# Patient Record
Sex: FEMALE | Race: WHITE | URBAN - METROPOLITAN AREA
[De-identification: names, ages, dates, MRNs, and addresses within clinical notes are randomized per-mention and may not be internally consistent; named-entity substitution may affect disease eponyms.]

---

## 2017-06-26 RX ORDER — ANTIARTHRITIC COMBINATION NO.2 900 MG
10 TABLET ORAL DAILY
COMMUNITY

## 2017-06-26 RX ORDER — PANTOTHENIC ACID (VIT B5) 500 MG
1000 TABLET ORAL 2 TIMES DAILY
COMMUNITY

## 2017-06-26 RX ORDER — B-COMPLEX WITH VITAMIN C
1 TABLET ORAL DAILY
COMMUNITY

## 2017-06-27 ENCOUNTER — ANESTHESIA (OUTPATIENT)
Dept: SURGERY | Facility: CLINIC | Age: 62
End: 2017-06-27
Payer: COMMERCIAL

## 2017-06-27 ENCOUNTER — HOSPITAL ENCOUNTER (OUTPATIENT)
Facility: CLINIC | Age: 62
Discharge: HOME | End: 2017-06-27
Attending: PLASTIC SURGERY | Admitting: PLASTIC SURGERY
Payer: COMMERCIAL

## 2017-06-27 ENCOUNTER — ANESTHESIA EVENT (OUTPATIENT)
Dept: SURGERY | Facility: CLINIC | Age: 62
End: 2017-06-27
Payer: COMMERCIAL

## 2017-06-27 VITALS
BODY MASS INDEX: 26.57 KG/M2 | WEIGHT: 175.31 LBS | SYSTOLIC BLOOD PRESSURE: 110 MMHG | OXYGEN SATURATION: 92 % | TEMPERATURE: 97.7 F | HEIGHT: 68 IN | DIASTOLIC BLOOD PRESSURE: 73 MMHG | RESPIRATION RATE: 13 BRPM

## 2017-06-27 DIAGNOSIS — G89.18 ACUTE POST-OPERATIVE PAIN: Primary | ICD-10-CM

## 2017-06-27 PROCEDURE — 25000128 H RX IP 250 OP 636: Performed by: PLASTIC SURGERY

## 2017-06-27 PROCEDURE — 36000056 ZZH SURGERY LEVEL 3 1ST 30 MIN: Performed by: PLASTIC SURGERY

## 2017-06-27 PROCEDURE — 40000170 ZZH STATISTIC PRE-PROCEDURE ASSESSMENT II: Performed by: PLASTIC SURGERY

## 2017-06-27 PROCEDURE — 37000008 ZZH ANESTHESIA TECHNICAL FEE, 1ST 30 MIN: Performed by: PLASTIC SURGERY

## 2017-06-27 PROCEDURE — 71000027 ZZH RECOVERY PHASE 2 EACH 15 MINS: Performed by: PLASTIC SURGERY

## 2017-06-27 PROCEDURE — 25000128 H RX IP 250 OP 636: Performed by: NURSE ANESTHETIST, CERTIFIED REGISTERED

## 2017-06-27 PROCEDURE — 25000125 ZZHC RX 250: Performed by: PLASTIC SURGERY

## 2017-06-27 PROCEDURE — 71000012 ZZH RECOVERY PHASE 1 LEVEL 1 FIRST HR: Performed by: PLASTIC SURGERY

## 2017-06-27 PROCEDURE — 27210794 ZZH OR GENERAL SUPPLY STERILE: Performed by: PLASTIC SURGERY

## 2017-06-27 PROCEDURE — 88305 TISSUE EXAM BY PATHOLOGIST: CPT | Mod: 26 | Performed by: PLASTIC SURGERY

## 2017-06-27 PROCEDURE — 71000013 ZZH RECOVERY PHASE 1 LEVEL 1 EA ADDTL HR: Performed by: PLASTIC SURGERY

## 2017-06-27 PROCEDURE — 37000009 ZZH ANESTHESIA TECHNICAL FEE, EACH ADDTL 15 MIN: Performed by: PLASTIC SURGERY

## 2017-06-27 PROCEDURE — 25000132 ZZH RX MED GY IP 250 OP 250 PS 637: Performed by: ANESTHESIOLOGY

## 2017-06-27 PROCEDURE — 25000125 ZZHC RX 250: Performed by: NURSE ANESTHETIST, CERTIFIED REGISTERED

## 2017-06-27 PROCEDURE — 88305 TISSUE EXAM BY PATHOLOGIST: CPT | Performed by: PLASTIC SURGERY

## 2017-06-27 PROCEDURE — 36000058 ZZH SURGERY LEVEL 3 EA 15 ADDTL MIN: Performed by: PLASTIC SURGERY

## 2017-06-27 RX ORDER — SODIUM CHLORIDE, SODIUM LACTATE, POTASSIUM CHLORIDE, CALCIUM CHLORIDE 600; 310; 30; 20 MG/100ML; MG/100ML; MG/100ML; MG/100ML
INJECTION, SOLUTION INTRAVENOUS CONTINUOUS PRN
Status: DISCONTINUED | OUTPATIENT
Start: 2017-06-27 | End: 2017-06-27

## 2017-06-27 RX ORDER — HYDROCODONE BITARTRATE AND ACETAMINOPHEN 5; 325 MG/1; MG/1
1-2 TABLET ORAL
Status: CANCELLED | OUTPATIENT
Start: 2017-06-27

## 2017-06-27 RX ORDER — CEFAZOLIN SODIUM 1 G/3ML
1 INJECTION, POWDER, FOR SOLUTION INTRAMUSCULAR; INTRAVENOUS SEE ADMIN INSTRUCTIONS
Status: DISCONTINUED | OUTPATIENT
Start: 2017-06-27 | End: 2017-06-27 | Stop reason: HOSPADM

## 2017-06-27 RX ORDER — FENTANYL CITRATE 50 UG/ML
25-50 INJECTION, SOLUTION INTRAMUSCULAR; INTRAVENOUS
Status: DISCONTINUED | OUTPATIENT
Start: 2017-06-27 | End: 2017-06-27 | Stop reason: HOSPADM

## 2017-06-27 RX ORDER — NALOXONE HYDROCHLORIDE 0.4 MG/ML
.1-.4 INJECTION, SOLUTION INTRAMUSCULAR; INTRAVENOUS; SUBCUTANEOUS
Status: DISCONTINUED | OUTPATIENT
Start: 2017-06-27 | End: 2017-06-27 | Stop reason: HOSPADM

## 2017-06-27 RX ORDER — HYDROCODONE BITARTRATE AND ACETAMINOPHEN 5; 325 MG/1; MG/1
1 TABLET ORAL ONCE
Status: COMPLETED | OUTPATIENT
Start: 2017-06-27 | End: 2017-06-27

## 2017-06-27 RX ORDER — FENTANYL CITRATE 50 UG/ML
25-50 INJECTION, SOLUTION INTRAMUSCULAR; INTRAVENOUS EVERY 5 MIN PRN
Status: DISCONTINUED | OUTPATIENT
Start: 2017-06-27 | End: 2017-06-27 | Stop reason: HOSPADM

## 2017-06-27 RX ORDER — ONDANSETRON 2 MG/ML
INJECTION INTRAMUSCULAR; INTRAVENOUS PRN
Status: DISCONTINUED | OUTPATIENT
Start: 2017-06-27 | End: 2017-06-27

## 2017-06-27 RX ORDER — HYDROCODONE BITARTRATE AND ACETAMINOPHEN 5; 325 MG/1; MG/1
1-2 TABLET ORAL EVERY 4 HOURS PRN
Qty: 40 TABLET | Refills: 0 | Status: SHIPPED | OUTPATIENT
Start: 2017-06-27

## 2017-06-27 RX ORDER — SODIUM CHLORIDE, SODIUM LACTATE, POTASSIUM CHLORIDE, CALCIUM CHLORIDE 600; 310; 30; 20 MG/100ML; MG/100ML; MG/100ML; MG/100ML
INJECTION, SOLUTION INTRAVENOUS CONTINUOUS
Status: DISCONTINUED | OUTPATIENT
Start: 2017-06-27 | End: 2017-06-27 | Stop reason: HOSPADM

## 2017-06-27 RX ORDER — PROPOFOL 10 MG/ML
INJECTION, EMULSION INTRAVENOUS PRN
Status: DISCONTINUED | OUTPATIENT
Start: 2017-06-27 | End: 2017-06-27

## 2017-06-27 RX ORDER — PROPOFOL 10 MG/ML
INJECTION, EMULSION INTRAVENOUS CONTINUOUS PRN
Status: DISCONTINUED | OUTPATIENT
Start: 2017-06-27 | End: 2017-06-27

## 2017-06-27 RX ORDER — BUPIVACAINE HYDROCHLORIDE AND EPINEPHRINE 2.5; 5 MG/ML; UG/ML
INJECTION, SOLUTION EPIDURAL; INFILTRATION; INTRACAUDAL; PERINEURAL
Status: DISCONTINUED
Start: 2017-06-27 | End: 2017-06-27 | Stop reason: HOSPADM

## 2017-06-27 RX ORDER — HYDROXYZINE HYDROCHLORIDE 25 MG/1
50 TABLET, FILM COATED ORAL ONCE
Status: COMPLETED | OUTPATIENT
Start: 2017-06-27 | End: 2017-06-27

## 2017-06-27 RX ORDER — CEFAZOLIN SODIUM 2 G/100ML
2 INJECTION, SOLUTION INTRAVENOUS
Status: COMPLETED | OUTPATIENT
Start: 2017-06-27 | End: 2017-06-27

## 2017-06-27 RX ORDER — ACETAMINOPHEN 325 MG/1
975 TABLET ORAL ONCE
Status: COMPLETED | OUTPATIENT
Start: 2017-06-27 | End: 2017-06-27

## 2017-06-27 RX ORDER — BUPIVACAINE HYDROCHLORIDE AND EPINEPHRINE 2.5; 5 MG/ML; UG/ML
INJECTION, SOLUTION INFILTRATION; PERINEURAL PRN
Status: DISCONTINUED | OUTPATIENT
Start: 2017-06-27 | End: 2017-06-27 | Stop reason: HOSPADM

## 2017-06-27 RX ORDER — FENTANYL CITRATE 50 UG/ML
INJECTION, SOLUTION INTRAMUSCULAR; INTRAVENOUS PRN
Status: DISCONTINUED | OUTPATIENT
Start: 2017-06-27 | End: 2017-06-27

## 2017-06-27 RX ORDER — ONDANSETRON 4 MG/1
4 TABLET, ORALLY DISINTEGRATING ORAL EVERY 30 MIN PRN
Status: DISCONTINUED | OUTPATIENT
Start: 2017-06-27 | End: 2017-06-27 | Stop reason: HOSPADM

## 2017-06-27 RX ORDER — MEPERIDINE HYDROCHLORIDE 25 MG/ML
12.5 INJECTION INTRAMUSCULAR; INTRAVENOUS; SUBCUTANEOUS
Status: DISCONTINUED | OUTPATIENT
Start: 2017-06-27 | End: 2017-06-27 | Stop reason: HOSPADM

## 2017-06-27 RX ORDER — CELECOXIB 200 MG/1
200 CAPSULE ORAL
Status: CANCELLED | OUTPATIENT
Start: 2017-06-27

## 2017-06-27 RX ORDER — DEXAMETHASONE SODIUM PHOSPHATE 4 MG/ML
INJECTION, SOLUTION INTRA-ARTICULAR; INTRALESIONAL; INTRAMUSCULAR; INTRAVENOUS; SOFT TISSUE PRN
Status: DISCONTINUED | OUTPATIENT
Start: 2017-06-27 | End: 2017-06-27

## 2017-06-27 RX ORDER — ONDANSETRON 2 MG/ML
4 INJECTION INTRAMUSCULAR; INTRAVENOUS EVERY 30 MIN PRN
Status: DISCONTINUED | OUTPATIENT
Start: 2017-06-27 | End: 2017-06-27 | Stop reason: HOSPADM

## 2017-06-27 RX ORDER — EPHEDRINE SULFATE 50 MG/ML
INJECTION, SOLUTION INTRAMUSCULAR; INTRAVENOUS; SUBCUTANEOUS PRN
Status: DISCONTINUED | OUTPATIENT
Start: 2017-06-27 | End: 2017-06-27

## 2017-06-27 RX ORDER — LIDOCAINE HYDROCHLORIDE 20 MG/ML
INJECTION, SOLUTION INFILTRATION; PERINEURAL PRN
Status: DISCONTINUED | OUTPATIENT
Start: 2017-06-27 | End: 2017-06-27

## 2017-06-27 RX ADMIN — DEXMEDETOMIDINE HYDROCHLORIDE 8 MCG: 100 INJECTION, SOLUTION INTRAVENOUS at 14:26

## 2017-06-27 RX ADMIN — SODIUM CHLORIDE, POTASSIUM CHLORIDE, SODIUM LACTATE AND CALCIUM CHLORIDE: 600; 310; 30; 20 INJECTION, SOLUTION INTRAVENOUS at 13:23

## 2017-06-27 RX ADMIN — FENTANYL CITRATE 50 MCG: 50 INJECTION, SOLUTION INTRAMUSCULAR; INTRAVENOUS at 14:02

## 2017-06-27 RX ADMIN — PROPOFOL 180 MCG/KG/MIN: 10 INJECTION, EMULSION INTRAVENOUS at 13:24

## 2017-06-27 RX ADMIN — MIDAZOLAM HYDROCHLORIDE 2 MG: 1 INJECTION, SOLUTION INTRAMUSCULAR; INTRAVENOUS at 13:23

## 2017-06-27 RX ADMIN — SODIUM CHLORIDE, POTASSIUM CHLORIDE, SODIUM LACTATE AND CALCIUM CHLORIDE: 600; 310; 30; 20 INJECTION, SOLUTION INTRAVENOUS at 15:03

## 2017-06-27 RX ADMIN — Medication 5 MG: at 13:33

## 2017-06-27 RX ADMIN — DEXMEDETOMIDINE HYDROCHLORIDE 8 MCG: 100 INJECTION, SOLUTION INTRAVENOUS at 14:43

## 2017-06-27 RX ADMIN — Medication 5 MG: at 15:11

## 2017-06-27 RX ADMIN — HYDROXYZINE HYDROCHLORIDE 50 MG: 25 TABLET ORAL at 11:56

## 2017-06-27 RX ADMIN — Medication 5 MG: at 13:31

## 2017-06-27 RX ADMIN — Medication 5 MG: at 15:02

## 2017-06-27 RX ADMIN — FENTANYL CITRATE 50 MCG: 50 INJECTION, SOLUTION INTRAMUSCULAR; INTRAVENOUS at 13:23

## 2017-06-27 RX ADMIN — ONDANSETRON 4 MG: 2 INJECTION INTRAMUSCULAR; INTRAVENOUS at 14:24

## 2017-06-27 RX ADMIN — FENTANYL CITRATE 50 MCG: 50 INJECTION, SOLUTION INTRAMUSCULAR; INTRAVENOUS at 14:18

## 2017-06-27 RX ADMIN — ONDANSETRON 4 MG: 2 INJECTION INTRAMUSCULAR; INTRAVENOUS at 14:58

## 2017-06-27 RX ADMIN — DEXMEDETOMIDINE HYDROCHLORIDE 4 MCG: 100 INJECTION, SOLUTION INTRAVENOUS at 14:57

## 2017-06-27 RX ADMIN — DEXAMETHASONE SODIUM PHOSPHATE 4 MG: 4 INJECTION, SOLUTION INTRA-ARTICULAR; INTRALESIONAL; INTRAMUSCULAR; INTRAVENOUS; SOFT TISSUE at 13:28

## 2017-06-27 RX ADMIN — PHENYLEPHRINE HYDROCHLORIDE 100 MCG: 10 INJECTION, SOLUTION INTRAMUSCULAR; INTRAVENOUS; SUBCUTANEOUS at 13:59

## 2017-06-27 RX ADMIN — PROPOFOL 200 MG: 10 INJECTION, EMULSION INTRAVENOUS at 13:24

## 2017-06-27 RX ADMIN — FENTANYL CITRATE 50 MCG: 50 INJECTION, SOLUTION INTRAMUSCULAR; INTRAVENOUS at 13:54

## 2017-06-27 RX ADMIN — CEFAZOLIN SODIUM 2 G: 2 INJECTION, SOLUTION INTRAVENOUS at 13:28

## 2017-06-27 RX ADMIN — PHENYLEPHRINE HYDROCHLORIDE 100 MCG: 10 INJECTION, SOLUTION INTRAMUSCULAR; INTRAVENOUS; SUBCUTANEOUS at 13:31

## 2017-06-27 RX ADMIN — PHENYLEPHRINE HYDROCHLORIDE 100 MCG: 10 INJECTION, SOLUTION INTRAMUSCULAR; INTRAVENOUS; SUBCUTANEOUS at 13:33

## 2017-06-27 RX ADMIN — HYDROCODONE BITARTRATE AND ACETAMINOPHEN 1 TABLET: 5; 325 TABLET ORAL at 16:39

## 2017-06-27 RX ADMIN — DEXMEDETOMIDINE HYDROCHLORIDE 4 MCG: 100 INJECTION, SOLUTION INTRAVENOUS at 14:52

## 2017-06-27 RX ADMIN — ACETAMINOPHEN 975 MG: 325 TABLET, FILM COATED ORAL at 11:56

## 2017-06-27 RX ADMIN — LIDOCAINE HYDROCHLORIDE 60 MG: 20 INJECTION, SOLUTION INFILTRATION; PERINEURAL at 13:24

## 2017-06-27 RX ADMIN — FENTANYL CITRATE 50 MCG: 50 INJECTION, SOLUTION INTRAMUSCULAR; INTRAVENOUS at 14:09

## 2017-06-27 NOTE — BRIEF OP NOTE
[facility] Brief Operative Note    Pre-operative diagnosis: MACROMASTIA   Post-operative diagnosis same   Procedure: Procedure(s):  BILATERAL REDUCTION MAMMOPLASTY - Wound Class: I-Clean   Surgeon(s): Surgeon(s) and Role:     * Dada Batista MD - Primary   Estimated blood loss: * No values recorded between 6/27/2017  1:34 PM and 6/27/2017  3:21 PM *    Specimens:   ID Type Source Tests Collected by Time Destination   A : right breast tissue wt: 615 grams Tissue Breast, Right SURGICAL PATHOLOGY EXAM Dada Batista MD 6/27/2017  2:17 PM    B : left breast tissue wt: 650 grams Tissue Breast, Left SURGICAL PATHOLOGY EXAM Dada Batista MD 6/27/2017  2:36 PM       Findings: Normal breast tissue

## 2017-06-27 NOTE — IP AVS SNAPSHOT
[facility]    [address]    [place] [address]    Phone:  [phone]    Fax:  [phone]                                       After Visit Summary   6/27/2017    Bernadette K Yeoman Ouellette    MRN: [id number]           After Visit Summary Signature Page     I have received my discharge instructions, and my questions have been answered. I have discussed any challenges I see with this plan with the nurse or doctor.    ..........................................................................................................................................  Patient/Patient Representative Signature      ..........................................................................................................................................  Patient Representative Print Name and Relationship to Patient    ..................................................               ................................................  Date                                            Time    ..........................................................................................................................................  Reviewed by Signature/Title    ...................................................              ..............................................  Date                                                            Time

## 2017-06-27 NOTE — DISCHARGE INSTRUCTIONS
While you were at the hospital today you were given 975 mg of Tylenol at 12:00 noon. The recommended daily maximum dose is 4000 mg.         Same Day Surgery Discharge Instructions for  Sedation and General Anesthesia       It's not unusual to feel dizzy, light-headed or faint for up to 24 hours after surgery or while taking pain medication.  If you have these symptoms: sit for a few minutes before standing and have someone assist you when you get up to walk or use the bathroom.      You should rest and relax for the next 24 hours. We recommend you make arrangements to have an adult stay with you for at least 24 hours after your discharge.  Avoid hazardous and strenuous activity.      DO NOT DRIVE any vehicle or operate mechanical equipment for 24 hours following the end of your surgery.  Even though you may feel normal, your reactions may be affected by the medication you have received.      Do not drink alcoholic beverages for 24 hours following surgery.       Slowly progress to your regular diet as you feel able. It's not unusual to feel nauseated and/or vomit after receiving anesthesia.  If you develop these symptoms, drink clear liquids (apple juice, ginger ale, broth, 7-up, etc. ) until you feel better.  If your nausea and vomiting persists for 24 hours, please notify your surgeon.        All narcotic pain medications, along with inactivity and anesthesia, can cause constipation. Drinking plenty of liquids and increasing fiber intake will help.      For any questions of a medical nature, call your surgeon.      Do not make important decisions for 24 hours.      If you had general anesthesia, you may have a sore throat for a couple of days related to the breathing tube used during surgery.  You may use Cepacol lozenges to help with this discomfort.  If it worsens or if you develop a fever, contact your surgeon.       If you feel your pain is not well managed with the pain medications prescribed by your surgeon,  please contact your surgeon's office to let them know so they can address your concerns.            Discharge Instructions following Breast Surgery  [facility]    Diet:   Resume diet as tolerated.  Drink plenty of fluids to prevent constipation.    Activity:   Gentle rotation & stretching of your arms/shoulders will prevent stiffness in joints   Increase activity gradually   No heavy lifting greater than 10-15 pounds & no strenuous activity until  approved by surgeon    Bathing/Incision Care:   You may shower as directed by surgeon   Pat incisions dry.  No lotions, powders or perfumes to incisions   Tape dressings (steri strips) will fall off in 7-10 days (if present)    What to expect:   A tingly or itchy sensation around the incision is a normal sign of healing   Some clear, pink drainage from incisions is normal.      Notify your surgeon for the following signs & symptoms:   Redness, warmth, or swelling of the incision    Foul smelling or increased drainage   Chills or temperature greater than 101 F   Pain not controlled by pain medications              Eleazar Gallegos Drain  Home Care Instructions    What is a Eleazar Gallegos (TJ) Drain?  This is a small tube that connects to a bulb.  Its gentle suction removes extra fluid from a surgical wound.  Your doctor will remove the tube when the amount of fluid decreases.  The color and amount of fluid varies.  Right after surgery the fluid is bright red.  Over time, it changes to light pink and may become clear or the color of straw.    How should I care for my tube site?    Keep the skin around the tube dry.  Check with your doctor about how to shower.  You may need to cover the site with plastic when you shower.  Or, it may be okay to let the site get wet and put on a clean bandage after you shower.      If the bandage gets wet, you will need to change it.  How should I care for the bulb?    Keep the bulb compressed at all times except while you  empty it.     Attach the bulb to your clothing with tape and a safety pin.    Try to empty the bulb at the same time every day.  Empty the bulb at least once a day, or when the bulb becomes half full.  If it becomes too full, there will not be enough suction.    To empty the bulb:    Wash your hands.    Open the bulb cap.    Drain the fluid from the bulb into the measuring cup.  If you have two drains, use two cups.      Clean the mouth of the bulb with an alcohol wipe if your nurse told you to.    Squeeze the bulb (fold it in half before you close the bulb cap) If it does not stay compressed, call your nurse or clinic.    Write the amount of drainage on the drainage record (see back page).  If you have two drains, write the amount for each bulb.    Flush the drainage down the toilet.  Rinse the measuring cup.    Wash your hands.    When should I call my doctor?   Call your doctor if:    You have a fever over 101 F (38.3 C), taken under the tongue.     The drainage increases or smells bad.    The skin around your tube has increased redness, swelling, warmth or pain.    You have pus or fluid leaking at the tube site.    Your stitches break.    You think the tube is not draining.    The tube falls out.    You have any problems or concerns.    Your drainage record:    Empty your bulb at least once per day or when 1/2 to 1/3 full.  Write down the date, time and amount of drainage in each bulb.   Bring this record to each clinic visit.    Date Time Bulb 1: amount of  Drainage in (ml or cc) Bulb 2: amount of drainage (in ml or cc) Notes                                                                **If you have questions or concerns about your procedure,  call Dr. Batista at [phone]**

## 2017-06-27 NOTE — ANESTHESIA CARE TRANSFER NOTE
Patient: Bernadette K Yeoman Ouellette    Procedure(s):  BILATERAL REDUCTION MAMMOPLASTY - Wound Class: I-Clean    Diagnosis: MACROMASTIA  Diagnosis Additional Information: No value filed.    Anesthesia Type:   General, LMA     Note:  Airway :Face Mask  Patient transferred to:PACU  Comments: Pt exhibits spontaneous respirations, follows commands, suctioned, LMA removed, exchanging well, transferred to pacu with O2 @ 10L via mask, all monitors and alarms on, report to RN, VSS.      Vitals: (Last set prior to Anesthesia Care Transfer)    CRNA VITALS  6/27/2017 1452 - 6/27/2017 1528      6/27/2017             NIBP: 116/76    NIBP Mean: 106                Electronically Signed By: SAKSHI Phan CRNA  June 27, 2017  3:28 PM

## 2017-06-27 NOTE — OR NURSING
PNDS met, po per I&O sheet. Pt dressed, up in recliner and transported to Phase 2.   Eleazar-Rosy drain care instructions demonstrated with both Mrs. Yeoman and her .   Sent home with  drain care supplies.

## 2017-06-27 NOTE — ANESTHESIA PREPROCEDURE EVALUATION
Anesthesia Evaluation     . Pt has had prior anesthetic. Type: General    No history of anesthetic complications          ROS/MED HX    ENT/Pulmonary:  - neg pulmonary ROS     Neurologic:     (+)migraines,     Cardiovascular:  - neg cardiovascular ROS       METS/Exercise Tolerance:     Hematologic:         Musculoskeletal:         GI/Hepatic:  - neg GI/hepatic ROS       Renal/Genitourinary:         Endo:         Psychiatric:         Infectious Disease:         Malignancy:         Other:                     Physical Exam  Normal systems: cardiovascular, pulmonary and dental    Airway   Mallampati: II  TM distance: >3 FB  Neck ROM: full    Dental     Cardiovascular   Rhythm and rate: regular and normal      Pulmonary    breath sounds clear to auscultation                    Anesthesia Plan      History & Physical Review  History and physical reviewed and following examination; no interval change.    ASA Status:  2 .        Plan for General and LMA with Propofol induction. Maintenance will be TIVA.    PONV prophylaxis:  Ondansetron (or other 5HT-3) and Dexamethasone or Solumedrol       Postoperative Care  Postoperative pain management:  IV analgesics and Oral pain medications.      Consents  Anesthetic plan, risks, benefits and alternatives discussed with:  Patient..                          .

## 2017-06-27 NOTE — ANESTHESIA POSTPROCEDURE EVALUATION
Patient: Bernadette K Yeoman Ouellette    Procedure(s):  BILATERAL REDUCTION MAMMOPLASTY - Wound Class: I-Clean    Diagnosis:MACROMASTIA  Diagnosis Additional Information: No value filed.    Anesthesia Type:  General, LMA    Note:  Anesthesia Post Evaluation    Patient location during evaluation: PACU  Patient participation: Able to fully participate in evaluation  Level of consciousness: awake  Pain management: adequate  Airway patency: patent  Cardiovascular status: acceptable  Respiratory status: acceptable  Hydration status: acceptable  PONV: none     Anesthetic complications: None          Last vitals:  Vitals:    06/27/17 1145 06/27/17 1525 06/27/17 1530   BP: 143/77 104/64 108/65   Resp: 16 10 9   Temp: 36.7  C (98.1  F) 36.3  C (97.3  F) 35.7  C (96.3  F)   SpO2: 97% 98% 99%         Electronically Signed By: Leobardo Bojorquez MD  June 27, 2017  3:48 PM

## 2017-06-27 NOTE — IP AVS SNAPSHOT
MRN:[id number]                      After Visit Summary   6/27/2017    Bernadette K Yeoman Ouellette    MRN: [id number]           Thank you!     Thank you for choosing [facility] for your care. Our goal is always to provide you with excellent care. Hearing back from our patients is one way we can continue to improve our services. Please take a few minutes to complete the written survey that you may receive in the mail after you visit with us. Thank you!        Patient Information     Date Of Birth          1955        About your hospital stay     You were admitted on:  June 27, 2017 You last received care in the:  [facility]    You were discharged on:  June 27, 2017       Who to Call     For medical emergencies, please call 911.  For non-urgent questions about your medical care, please call your primary care provider or clinic, [phone]  For questions related to your surgery, please call your surgery clinic        Attending Provider     Provider Specialty    Dada Batista MD Plastic Surgery       Primary Care Provider Office Phone # Fax #    Solange Lamb [phone]      After Care Instructions     Discharge Instructions       Resume pre procedure diet            Discharge Instructions       Lifting limit of  ten  pounds until seen at Post-op follow up appointment            Discharge Instructions       Leave ace wrap on for 48 hours, adjust if too tight or too loose.  Remove after 48 hours and shower, leave steri strips intact, use sports bra or re wrap ace to breasts. Keep elbows at sides, no lifting or reaching.            Discharge Instructions       Follow up with Surgeon in  Two days.  Call clinic with any problems.            Ice to affected area       Ice PRN, No heat to area for 24 hours for medical brachial blocks            No Alcohol       No Alcohol for 24 hours post procedure            No Aspirin, Ibuprofen or Naproxen       No  Aspirin, Ibuprofen or Naproxen products for 7 - 10 days following surgery            No driving or operating machinery       No driving or operating machinery until day after procedure                  Further instructions from your care team       While you were at the hospital today you were given 975 mg of Tylenol at 12:00 noon. The recommended daily maximum dose is 4000 mg.         Same Day Surgery Discharge Instructions for  Sedation and General Anesthesia       It's not unusual to feel dizzy, light-headed or faint for up to 24 hours after surgery or while taking pain medication.  If you have these symptoms: sit for a few minutes before standing and have someone assist you when you get up to walk or use the bathroom.      You should rest and relax for the next 24 hours. We recommend you make arrangements to have an adult stay with you for at least 24 hours after your discharge.  Avoid hazardous and strenuous activity.      DO NOT DRIVE any vehicle or operate mechanical equipment for 24 hours following the end of your surgery.  Even though you may feel normal, your reactions may be affected by the medication you have received.      Do not drink alcoholic beverages for 24 hours following surgery.       Slowly progress to your regular diet as you feel able. It's not unusual to feel nauseated and/or vomit after receiving anesthesia.  If you develop these symptoms, drink clear liquids (apple juice, ginger ale, broth, 7-up, etc. ) until you feel better.  If your nausea and vomiting persists for 24 hours, please notify your surgeon.        All narcotic pain medications, along with inactivity and anesthesia, can cause constipation. Drinking plenty of liquids and increasing fiber intake will help.      For any questions of a medical nature, call your surgeon.      Do not make important decisions for 24 hours.      If you had general anesthesia, you may have a sore throat for a couple of days related to the breathing  tube used during surgery.  You may use Cepacol lozenges to help with this discomfort.  If it worsens or if you develop a fever, contact your surgeon.       If you feel your pain is not well managed with the pain medications prescribed by your surgeon, please contact your surgeon's office to let them know so they can address your concerns.            Discharge Instructions following Breast Surgery  [facility]    Diet:   Resume diet as tolerated.  Drink plenty of fluids to prevent constipation.    Activity:   Gentle rotation & stretching of your arms/shoulders will prevent stiffness in joints   Increase activity gradually   No heavy lifting greater than 10-15 pounds & no strenuous activity until  approved by surgeon    Bathing/Incision Care:   You may shower as directed by surgeon   Pat incisions dry.  No lotions, powders or perfumes to incisions   Tape dressings (steri strips) will fall off in 7-10 days (if present)    What to expect:   A tingly or itchy sensation around the incision is a normal sign of healing   Some clear, pink drainage from incisions is normal.      Notify your surgeon for the following signs & symptoms:   Redness, warmth, or swelling of the incision    Foul smelling or increased drainage   Chills or temperature greater than 101 F   Pain not controlled by pain medications              Eleazar Gallegos Drain  Home Care Instructions    What is a Eleazar Gallegos (TJ) Drain?  This is a small tube that connects to a bulb.  Its gentle suction removes extra fluid from a surgical wound.  Your doctor will remove the tube when the amount of fluid decreases.  The color and amount of fluid varies.  Right after surgery the fluid is bright red.  Over time, it changes to light pink and may become clear or the color of straw.    How should I care for my tube site?    Keep the skin around the tube dry.  Check with your doctor about how to shower.  You may need to cover the site with plastic  when you shower.  Or, it may be okay to let the site get wet and put on a clean bandage after you shower.      If the bandage gets wet, you will need to change it.  How should I care for the bulb?    Keep the bulb compressed at all times except while you empty it.     Attach the bulb to your clothing with tape and a safety pin.    Try to empty the bulb at the same time every day.  Empty the bulb at least once a day, or when the bulb becomes half full.  If it becomes too full, there will not be enough suction.    To empty the bulb:    Wash your hands.    Open the bulb cap.    Drain the fluid from the bulb into the measuring cup.  If you have two drains, use two cups.      Clean the mouth of the bulb with an alcohol wipe if your nurse told you to.    Squeeze the bulb (fold it in half before you close the bulb cap) If it does not stay compressed, call your nurse or clinic.    Write the amount of drainage on the drainage record (see back page).  If you have two drains, write the amount for each bulb.    Flush the drainage down the toilet.  Rinse the measuring cup.    Wash your hands.    When should I call my doctor?   Call your doctor if:    You have a fever over 101 F (38.3 C), taken under the tongue.     The drainage increases or smells bad.    The skin around your tube has increased redness, swelling, warmth or pain.    You have pus or fluid leaking at the tube site.    Your stitches break.    You think the tube is not draining.    The tube falls out.    You have any problems or concerns.    Your drainage record:    Empty your bulb at least once per day or when 1/2 to 1/3 full.  Write down the date, time and amount of drainage in each bulb.   Bring this record to each clinic visit.    Date Time Bulb 1: amount of  Drainage in (ml or cc) Bulb 2: amount of drainage (in ml or cc) Notes                                                                **If you have questions or concerns about your procedure,  call   "[facility] at [phone]**          Pending Results     Date and Time Order Name Status Description    2017 1418 Surgical pathology exam In process             Admission Information     Date & Time Provider Department Dept. Phone    2017 Dada Batista MD [facility] Same Day Surgery [phone]      Your Vitals Were     Blood Pressure Temperature Respirations Height Weight Pulse Oximetry    108/66 97.7  F (36.5  C) (Skin) 13 1.727 m (5' 8\") 79.5 kg (175 lb 5 oz) 94%    BMI (Body Mass Index)                   26.66 kg/m2           MyChart Information     [employer] lets you send messages to your doctor, view your test results, renew your prescriptions, schedule appointments and more. To sign up, go to [email or web address] . Click on \"Log in\" on the left side of the screen, which will take you to the Welcome page. Then click on \"Sign up Now\" on the right side of the page.     You will be asked to enter the access code listed below, as well as some personal information. Please follow the directions to create your username and password.     Your access code is: [email or web address]  Expires: 2017  3:48 PM     Your access code will  in 90 days. If you need help or a new code, please call your [facility] clinic or [phone].        Care EveryWhere ID     This is your Care EveryWhere ID. This could be used by other organizations to access your [facility] medical records  [id number]        Equal Access to Services     MONIK SALEEM : [place] Sohyacinth, waaxda luqadaha, qaybta kaalmada devi, jing stafford. So [facility] [phone].    ATENCIÓN: Si habla español, tiene a miner disposición servicios gratuitos de asistencia lingüística. Llame al [phone].    We comply with applicable federal civil rights laws and [place] laws. We do not discriminate on the basis of race, color, national origin, age, disability sex, sexual orientation or gender identity.       "         Review of your medicines      START taking        Dose / Directions    HYDROcodone-acetaminophen 5-325 MG per tablet   Commonly known as:  NORCO   Used for:  Acute post-operative pain   Notes to Patient:  You had 1 [place] at 4:45 pm today.        Dose:  1-2 tablet   Take 1-2 tablets by mouth every 4 hours as needed for other (Moderate to Severe Pain)   Quantity:  40 tablet   Refills:  0         CONTINUE these medicines which have NOT CHANGED        Dose / Directions    ACETYLCYSTEINE PO        Dose:  600 mg   Take 600 mg by mouth 2 times daily   Refills:  0       CITRUCEL PO        Dose:  500 mg   Take 500 mg by mouth 8 CAPSULES PER DAY   Refills:  0       COLACE PO        Dose:  250 mg   Take 250 mg by mouth daily   Refills:  0       DETROL LA PO        Dose:  4 mg   Take 4 mg by mouth daily   Refills:  0       dhea 25 MG Tabs        Dose:  10 mg   Take 10 mg by mouth daily   Refills:  0       glutamine 500 MG capsule        Dose:  500 mg   Take 500 mg by mouth 3 times daily   Refills:  0       MAGNESIUM OXIDE PO        Dose:  150 mg   Take 150 mg by mouth 2 times daily   Refills:  0       pantothenic acid 500 MG Tabs        Dose:  1000 mg   Take 1,000 mg by mouth 2 times daily   Refills:  0       PROBIOTIC ADVANCED PO        Dose:  1 tablet   Take 1 tablet by mouth daily   Refills:  0       * UNABLE TO FIND        Apply topically daily MEDICATION NAME: estradiol-estriol-progesterone   Refills:  0       * UNABLE TO FIND        Dose:  80 mg   80 mg 3 times daily as needed MEDICATION NAME: Cytozyme   Refills:  0       Zinc 100 MG Tabs        Dose:  1 tablet   Take 1 tablet by mouth daily   Refills:  0       * Notice:  This list has 2 medication(s) that are the same as other medications prescribed for you. Read the directions carefully, and ask your doctor or other care provider to review them with you.         Where to get your medicines      Some of these will need a paper prescription and others can be  bought over the counter. Ask your nurse if you have questions.     Bring a paper prescription for each of these medications     HYDROcodone-acetaminophen 5-325 MG per tablet                Protect others around you: Learn how to safely use, store and throw away your medicines at www.disposemymeds.org.             Medication List: This is a list of all your medications and when to take them. Check marks below indicate your daily home schedule. Keep this list as a reference.      Medications           Morning Afternoon Evening Bedtime As Needed    ACETYLCYSTEINE PO   Take 600 mg by mouth 2 times daily                                CITRUCEL PO   Take 500 mg by mouth 8 CAPSULES PER DAY                                COLACE PO   Take 250 mg by mouth daily                                DETROL LA PO   Take 4 mg by mouth daily                                dhea 25 MG Tabs   Take 10 mg by mouth daily                                glutamine 500 MG capsule   Take 500 mg by mouth 3 times daily                                HYDROcodone-acetaminophen 5-325 MG per tablet   Commonly known as:  NORCO   Take 1-2 tablets by mouth every 4 hours as needed for other (Moderate to Severe Pain)   Last time this was given:  1 tablet on 6/27/2017  4:39 PM   Notes to Patient:  You had 1 [place] at 4:45 pm today.                                MAGNESIUM OXIDE PO   Take 150 mg by mouth 2 times daily                                pantothenic acid 500 MG Tabs   Take 1,000 mg by mouth 2 times daily                                PROBIOTIC ADVANCED PO   Take 1 tablet by mouth daily                                * UNABLE TO FIND   Apply topically daily MEDICATION NAME: estradiol-estriol-progesterone                                * UNABLE TO FIND   80 mg 3 times daily as needed MEDICATION NAME: Cytozyme                                Zinc 100 MG Tabs   Take 1 tablet by mouth daily                                * Notice:  This list has 2  medication(s) that are the same as other medications prescribed for you. Read the directions carefully, and ask your doctor or other care provider to review them with you.

## 2017-06-28 LAB — COPATH REPORT: NORMAL

## 2017-08-03 NOTE — OP NOTE
Provider:  Dada Batista MD ([id number])   Patient:  YEOMAN OUELLETTE, BERNADETTE  MRN:  [id number]  :  1955    DATE OF SERVICE:  2017    PREOPERATIVE DIAGNOSIS:  Bilateral symptomatic macromastia.      POSTOPERATIVE DIAGNOSIS:  Bilateral symptomatic macromastia.      PROCEDURE:  Bilateral breast reduction.      SURGEON:  Dada Batista MD    ANESTHESIA:  General.    COMPLICATIONS:  None.      INDICATIONS:  The patient is a 62-year-old female with dense heavy breasts bilaterally contributing directly to upper back, neck and shoulder pain.  She desires a bilateral reduction mammoplasty for alleviation of symptoms.  I have discussed with her preoperatively the risks which include but are not exclusive to bleeding, infection, wound healing complications, nipple sensitivity changes, abnormal pathology, asymmetry, need for revisional surgery and scarring.  She would like to proceed.      DESCRIPTION OF PROCEDURE:  The patient was marked and taken to the operating room and placed supine.  After smooth induction of general anesthesia, she was prepped and draped sterilely.  I began on the right-hand side.  All incisions were scored after injection with 0.25% Marcaine with epinephrine.  After scoring incisions, the medial-based pedicle was developed by de-epithelialization and dissection of the chest wall, leaving a broad base.  Appropriate tissue inferolaterally and superiorly was resected.  Hemostasis was achieved with electrocautery.  The breast was coned into position with 2-0 Vicryl and the nipple-areolar complex was inset with 3-0 Monocryl.  A small dog ear was corrected laterally and liposuction was performed in the axillary region to flatten it out.  Further Marcaine was injected subfascially.  Total resected weight was 615 g.  A mirror procedure was done contralaterally, resecting 650 g.  At this point, she had excellent shape and symmetry.  Final closure was performed with a running 3-0 and 4-0  Monocryl suture.  The only issue was, in closing the inferolateral incision on the right-hand side, a needle broke off from the swage portion of the suture and was embedded in the tissue.  It was later identified, found and retrieved without difficulty.  Final closure was performed.  A compressive dressing was placed.  Estimated blood loss was 50 mL.  She was extubated in the operating room and taken to recovery in good condition.

## 2017-08-09 NOTE — OP NOTE
DATE OF PROCEDURE:  06/27/2017      PREOPERATIVE DIAGNOSIS:  Bilateral symptomatic macromastia.      POSTOPERATIVE DIAGNOSIS:  Bilateral symptomatic macromastia.      PROCEDURE:  Bilateral breast reduction.      SURGEON:  Marce Mantilla MD      ANESTHESIA:  General.      COMPLICATIONS:  None.      INDICATIONS:  Bernadette Yeoman Ouellette is a 62-year-old female with chronic back, neck and shoulder discomfort, desiring bilateral reduction mammoplasty.  I have discussed with her preoperatively the proposed procedure.  We talked about the risks which include bleeding, infection, nipple sensitivity changes wound healing complication, scarring, abnormal pathology, asymmetry, dissatisfaction with size, shape, etc.  She wishes to proceed.      DESCRIPTION OF PROCEDURE:  The patient was marked with standard Wise patterns bilaterally.  She was taken to the operating room and placed supine.  After smooth induction of general anesthesia, she was prepped and draped sterilely.  I began on the right side, all incisions were injected with 0.25% Marcaine with epinephrine and then scored.  The medial based pedicle was de-epithelialized and was created by dissection of the chest wall, leaving a broad base.  Appropriate tissue inferolaterally and superiorly was resected.  Hemostasis was achieved with electrocautery.  The breast was coned into position with 3-0 Monocryl in the nipple areolar complex and 2-0 Vicryls along the horizontal and vertical limbs.  The weight of this right breast was 615 grams that was resected.  A mirror procedure was done contralaterally resecting 650 grams.  She had excellent shape and symmetry.  Final closure was performed with 3-0 and 4-0 Monocryl sutures.  Small amount of liposuction was performed laterally for shaping purposes.  She was awakened from anesthesia and taken to the recovery room in good condition.         MARCE MANTILLA MD             D: 08/08/2017 15:50   T: 08/08/2017 22:18    MT: [id number]      Name:     YEOMAN OUELLETTE, BERNADETTE   MRN:      [id number]        Account:        [id number]   :      1955           Procedure Date: 2017      Document: [id number]

## (undated) DEVICE — NDL SPINAL 22GA 3.5" QUINCKE 405181

## (undated) DEVICE — BLADE KNIFE SURG 10 371110

## (undated) DEVICE — DRSG ADAPTIC 3X8" 6113

## (undated) DEVICE — GLOVE PROTEXIS W/NEU-THERA 7.0  2D73TE70

## (undated) DEVICE — PEN MARKING SKIN

## (undated) DEVICE — DRSG ABDOMINAL 12X16"

## (undated) DEVICE — SU SILK 2-0 FSL 18" 677G

## (undated) DEVICE — DRAIN JACKSON PRATT CHANNEL 19FR ROUND HUBLESS SIL JP-2230

## (undated) DEVICE — LINEN TOWEL PACK X5 5464

## (undated) DEVICE — PAD CHUX UNDERPAD 23X24" 7136

## (undated) DEVICE — SYR 03ML LL W/O NDL 309657

## (undated) DEVICE — GLOVE PROTEXIS W/NEU-THERA 6.5  2D73TE65

## (undated) DEVICE — SOL NACL 0.9% IRRIG 1000ML BOTTLE 07138-09

## (undated) DEVICE — TUBING SUCTION LIPECTOMY

## (undated) DEVICE — MAMMARY SUPPORT MED LATEX

## (undated) DEVICE — SU MONOCRYL 3-0 PS-2 27" Y427H

## (undated) DEVICE — PREP SKIN SCRUB TRAY 4461A

## (undated) DEVICE — SUCTION CANISTER MEDIVAC LINER 3000ML W/LID 65651-530

## (undated) DEVICE — SYR 10ML FINGER CONTROL W/O NDL 309695

## (undated) DEVICE — SPONGE LAP 18X18" X8435

## (undated) DEVICE — DRAPE BREAST/CHEST 29420

## (undated) DEVICE — GLOVE PROTEXIS BLUE W/NEU-THERA 6.0  2D73EB60

## (undated) DEVICE — SU VICRYL 2-0 FS-1 27" UND  J443H

## (undated) DEVICE — ESU PENCIL W/SMOKE EVAC E2515HS

## (undated) DEVICE — DRAIN JACKSON PRATT RESERVOIR 100ML SU130-1305

## (undated) DEVICE — BNDG ELASTIC 6" DBL LENGTH UNSTERILE 6611-16

## (undated) DEVICE — SU MONOCRYL 4-0 PS-2 18" UND Y496G

## (undated) DEVICE — PACK MAJOR SBA15MAFSI

## (undated) DEVICE — GLOVE PROTEXIS W/NEU-THERA 8.0  2D73TE80

## (undated) DEVICE — DRSG STERI STRIP 1/2X4" R1547

## (undated) RX ORDER — HYDROXYZINE HYDROCHLORIDE 25 MG/1
TABLET, FILM COATED ORAL
Status: DISPENSED
Start: 2017-06-27

## (undated) RX ORDER — FENTANYL CITRATE 50 UG/ML
INJECTION, SOLUTION INTRAMUSCULAR; INTRAVENOUS
Status: DISPENSED
Start: 2017-06-27

## (undated) RX ORDER — ACETAMINOPHEN 325 MG/1
TABLET ORAL
Status: DISPENSED
Start: 2017-06-27

## (undated) RX ORDER — CEFAZOLIN SODIUM 2 G/100ML
INJECTION, SOLUTION INTRAVENOUS
Status: DISPENSED
Start: 2017-06-27

## (undated) RX ORDER — HYDROCODONE BITARTRATE AND ACETAMINOPHEN 5; 325 MG/1; MG/1
TABLET ORAL
Status: DISPENSED
Start: 2017-06-27